# Patient Record
Sex: FEMALE | Race: WHITE | NOT HISPANIC OR LATINO | Employment: STUDENT | URBAN - METROPOLITAN AREA
[De-identification: names, ages, dates, MRNs, and addresses within clinical notes are randomized per-mention and may not be internally consistent; named-entity substitution may affect disease eponyms.]

---

## 2017-02-24 ENCOUNTER — OFFICE VISIT (OUTPATIENT)
Dept: NEUROLOGY | Facility: CLINIC | Age: 22
End: 2017-02-24
Payer: COMMERCIAL

## 2017-02-24 VITALS
BODY MASS INDEX: 22.08 KG/M2 | SYSTOLIC BLOOD PRESSURE: 107 MMHG | WEIGHT: 149.06 LBS | DIASTOLIC BLOOD PRESSURE: 71 MMHG | HEART RATE: 65 BPM | HEIGHT: 69 IN

## 2017-02-24 DIAGNOSIS — G44.86 CERVICOGENIC HEADACHE: ICD-10-CM

## 2017-02-24 DIAGNOSIS — M54.81 BILATERAL OCCIPITAL NEURALGIA: ICD-10-CM

## 2017-02-24 DIAGNOSIS — S06.0X0A CONCUSSION, WITHOUT LOSS OF CONSCIOUSNESS, INITIAL ENCOUNTER: Primary | ICD-10-CM

## 2017-02-24 DIAGNOSIS — H51.9 CONVERGENCE INSUFFICIENCY OR PALSY IN BINOCULAR EYE MOVEMENT: ICD-10-CM

## 2017-02-24 DIAGNOSIS — H81.90 VESTIBULOPATHY, UNSPECIFIED LATERALITY: ICD-10-CM

## 2017-02-24 DIAGNOSIS — R51.9 CHRONIC DAILY HEADACHE: ICD-10-CM

## 2017-02-24 PROCEDURE — 99244 OFF/OP CNSLTJ NEW/EST MOD 40: CPT | Mod: S$GLB,,, | Performed by: PSYCHIATRY & NEUROLOGY

## 2017-02-24 PROCEDURE — 99999 PR PBB SHADOW E&M-EST. PATIENT-LVL III: CPT | Mod: PBBFAC,,, | Performed by: PSYCHIATRY & NEUROLOGY

## 2017-02-24 RX ORDER — ACETAMINOPHEN 500 MG
500 TABLET ORAL EVERY 6 HOURS PRN
COMMUNITY
End: 2017-05-11

## 2017-02-24 RX ORDER — NAPROXEN 500 MG/1
500 TABLET ORAL
Refills: 0 | COMMUNITY
Start: 2017-02-21 | End: 2017-05-11

## 2017-02-24 RX ORDER — METHYLPREDNISOLONE 4 MG/1
TABLET ORAL
Qty: 1 PACKAGE | Refills: 0 | Status: SHIPPED | OUTPATIENT
Start: 2017-02-24 | End: 2017-05-11

## 2017-02-24 RX ORDER — ALBUTEROL SULFATE 90 UG/1
AEROSOL, METERED RESPIRATORY (INHALATION)
Refills: 6 | COMMUNITY
Start: 2016-12-28

## 2017-02-24 RX ORDER — TIZANIDINE 2 MG/1
4 TABLET ORAL EVERY 6 HOURS PRN
Qty: 20 TABLET | Refills: 0 | Status: SHIPPED | OUTPATIENT
Start: 2017-02-24 | End: 2017-03-06

## 2017-02-24 RX ORDER — CYPROHEPTADINE HYDROCHLORIDE 4 MG/1
4 TABLET ORAL 2 TIMES DAILY
Qty: 60 TABLET | Refills: 0 | Status: SHIPPED | OUTPATIENT
Start: 2017-02-24 | End: 2017-05-11

## 2017-02-24 NOTE — PROGRESS NOTES
Subjective:       Patient ID: Castro Rao is a 21 y.o. female.    Chief Complaint:  Consult    Consultation Requested by:   Kang Galvez Md  [address] [place] [address]    History of Present Illness  21-year-old right-handed female presents for evaluation of concussion sustained on 2/20/17 at approximately 6 PM when she was AT [facility] at the [facility] drinking water from the water fountain built into an alcove.  She notes that she pulled her head up quickly and slammed her face and occiput against the wall/alcove near the water fountain.  There was no loss of consciousness at that time.  She does have chronic headaches which she has had almost daily for 6 years and she notes that she had a severe increase in her headaches.  She is quite disoriented and went back to her room.  Her boyfriend brought her to the emergency room at [facility].  At that time she complained of headache and neck pain and had a CT scan of the head and neck which showed no acute process by patient report.  She notes that she was given nausea medication as well as headache medication which did not help and actually made her symptoms somewhat worse.  She notes that since that time she has had a very difficult week and has actually missed a test due to severe photophobia.  She notes that naproxen has helped somewhat although has made her tired.  She is also been taking extra strength Tylenol on top of this.  She notes that her headaches currently are you worse than her usual headaches.  She notes that she has pain in the nasal area, bitemporal, by lateral occipital, bilateral nuchal areas accompanied with light sensitivity, sound sensitivity, nausea but no vomiting.  Her boyfriend notes that she has had nausea for approximately 2 months it has worsened with her menstrual cycle.  She notes that previously when she had taken Excedrin for her 6 years of daily headache this had caused her  nausea.  She notes that she will take for 5 Advil one day a week or every 10 days.  She notes that she has tried a host of migraine medications previously but this caused her nausea and dizziness.  She notes that this is her fourth concussion.  She notes that she had a concussion in June 2016 when she was trying to show one of her friends had a serve and she was struck in the back of the head by a fan blade.  She was injured during her sophomore year while she was surfing.  She was injured during her freshman year while playing sambaash.  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  She denies a history of ADD and learning disability, anxiety and depression, sleep apnea or narcolepsy, epilepsy.  She admits to having one seizure that might have been related to her pertussis vaccination when she was younger.  She presents in a baseball and sunglasses.  She is extremely photophobic throughout the visit.    Past Medical History:   Diagnosis Date    Frequent headaches     Hernia, abdominal     Vertigo        Past Surgical History:   Procedure Laterality Date    HERNIA REPAIR      WRIST SURGERY         History reviewed. No pertinent family history.    Social History     Social History    Marital status: Single     Spouse name: N/A    Number of children: N/A    Years of education: N/A     Social History Main Topics    Smoking status: Never Smoker    Smokeless tobacco: None    Alcohol use Yes    Drug use: No    Sexual activity: Not Asked     Other Topics Concern    None     Social History Narrative       Review of Systems  Review of Systems   Constitutional: Positive for fatigue.   Eyes: Positive for photophobia and visual disturbance.   Musculoskeletal: Positive for neck pain and neck stiffness.   Neurological: Positive for dizziness, light-headedness and headaches.   Psychiatric/Behavioral: Positive for confusion, decreased concentration, dysphoric mood and sleep  disturbance. The patient is nervous/anxious.    All other systems reviewed and are negative.      Objective:     Vitals:    02/24/17 1344   BP: 107/71   Pulse: 65      Physical Exam   Constitutional: She is oriented to person, place, and time. She appears well-developed and well-nourished. No distress.   HENT:   Head: Normocephalic and atraumatic.   Right Ear: Hearing normal.   Left Ear: Hearing normal.   Eyes: EOM are normal. Pupils are equal, round, and reactive to light. Right eye exhibits normal extraocular motion and no nystagmus. Left eye exhibits normal extraocular motion and no nystagmus.   Neck: Neck supple. Muscular tenderness present. No spinous process tenderness present. Carotid bruit is not present. No rigidity. Decreased range of motion present.       Cardiovascular: Exam reveals no S4.    Neurological: She is alert and oriented to person, place, and time. She has normal strength and normal reflexes. She displays no atrophy and no tremor. No cranial nerve deficit or sensory deficit. She exhibits normal muscle tone. She displays a negative Romberg sign. Coordination and gait normal. GCS eye subscore is 4. GCS verbal subscore is 5. GCS motor subscore is 6.   Reflex Scores:       Tricep reflexes are 2+ on the right side and 2+ on the left side.       Bicep reflexes are 2+ on the right side and 2+ on the left side.       Brachioradialis reflexes are 2+ on the right side and 2+ on the left side.       Patellar reflexes are 2+ on the right side and 2+ on the left side.       Achilles reflexes are 2+ on the right side and 2+ on the left side.  Fundoscopic exam shows no papilledema, no hemorrhage, no exudates bilaterally.    Cranial nerves 2-12 are without deficit.   Psychiatric: She has a normal mood and affect. Her speech is normal and behavior is normal. Thought content normal.   Vitals reviewed.      Neurologic Exam     Mental Status   Oriented to person, place, and time.   Attention: decreased.  Concentration: decreased.   Speech: speech is normal   Level of consciousness: alert  Knowledge: good.   Normal comprehension.     Cranial Nerves   Cranial nerves II through XII intact.     CN II   Visual fields full to confrontation.     CN III, IV, VI   Pupils are equal, round, and reactive to light.  Extraocular motions are normal.     CN V   Facial sensation intact.     CN VII   Facial expression full, symmetric.     CN VIII   CN VIII normal.     CN IX, X   CN IX normal.   CN X normal.     CN XI   CN XI normal.     CN XII   CN XII normal.        THOMAS is abnormal, on floor 7/10/7.    Convergence insufficiency at 40 cm.  Transient diplopia bilaterally on horizontal extraocular motion.     Motor Exam   Muscle bulk: normal  Overall muscle tone: normal    Strength   Strength 5/5 throughout.     Sensory Exam   Light touch normal.   Pinprick normal.     Gait, Coordination, and Reflexes     Gait  Gait: wide-based    Reflexes   Right brachioradialis: 2+  Left brachioradialis: 2+  Right biceps: 2+  Left biceps: 2+  Right triceps: 2+  Left triceps: 2+  Right patellar: 2+  Left patellar: 2+  Right achilles: 2+  Left achilles: 2+    I spent over 50% of a 45 minute visit in guidance, counseling and discussion of treatment options.  Assessment:        1. Concussion, without loss of consciousness, initial encounter  methylPREDNISolone (MEDROL DOSEPACK) 4 mg tablet    tizanidine (ZANAFLEX) 2 MG tablet    cyproheptadine (PERIACTIN) 4 mg tablet   2. Cervicogenic headache  tizanidine (ZANAFLEX) 2 MG tablet   3. Bilateral occipital neuralgia     4. Vestibulopathy, unspecified laterality     5. Convergence insufficiency or palsy in binocular eye movement     6. Chronic daily headache  cyproheptadine (PERIACTIN) 4 mg tablet        Plan:       21-year-old right-handed female presents for evaluation and follow-up of concussion sustained on 2/20/17 when she struck her head after rapidly picking up after drinking from water fountain at  [facility].  She is a senior in public health and notes that she has missed a class.  I have given her academic accommodations as outlined below.  Should her professors require additional paperwork I will happy to have my office provide it.  At this point in time due to her significant cervicogenic component of her headache and muscle tenderness I will prescribe her tizanidine when necessary muscle spasm.  I will also prescribe her Periactin by mouth twice a day for her headaches and her anorexia related to her concussion.  As the naproxen has been helpful but has not completely taking away her headaches I will prescribe her a Medrol Dosepak to supply the anti-inflammatory for a six-day course.  I have also recommended melatonin that she take to help with her insomnia until I see her next.  I will see her back in 2-3 weeks.  At that time if she is still having problems and I would consider referral to physical therapy and occupational therapy for vestibular rehabilitation, cervicogenic headache rehabilitation and oculomotor rehabilitation actively.  We have had a long discussion about the pathophysiology and expected trajectory of improvement after concussion on today's visit.  I've discussed risk benefits and alternatives to the treatment plan as outlined above.  I have discussed with the patient that once she is back to her normal level of cognition after the concussion is over we may discuss the appropriate treatment potentially for chronic daily headaches.     The patient verbalizes understanding and agreement with the treatment plan. Questions were sought and answered to her stated verbal satisfaction.        Faina Arias MD    This note is dictated on Dragon Natural Speaking word recognition program. There are word recognition mistakes that are occasionally missed on review.

## 2017-02-24 NOTE — LETTER
February 24, 2017      Kang Smith MD  [address] [facility] [address]           [facility]  [address]  [facility] [address]  Phone: [phone]  Fax: [phone]          Patient: Castro Rao   MR Number: [id number]   YOB: 1995   Date of Visit: 2/24/2017       Dear Dr. Kang Smith:    Thank you for referring Castro Rao to me for evaluation. Attached you will find relevant portions of my assessment and plan of care.    If you have questions, please do not hesitate to call me. I look forward to following Castro Rao along with you.    Sincerely,    Narciso Arias III, MD    Enclosure  CC:  No Recipients    If you would like to receive this communication electronically, please contact [email or web address] or [phone] to request more information on [employer] Link access.    For providers and/or their staff who would like to refer a patient to [facility], please contact us through our one-stop-shop provider referral line, [facility], at [phone].    If you feel you have received this communication in error or would no longer like to receive these types of communications, please e-mail [email or web address]

## 2017-02-24 NOTE — PATIENT INSTRUCTIONS
Neck Exercises: Head Lifts       Do this exercise on your hands and knees. Keep your knees under your hips and your hands under your shoulders. Keep your spine in a neutral position (not arched or sagging). Keep your ears in line with your shoulders. Hold for a few seconds before starting the exercise:  · Keeping your back straight, slowly drop your chin toward your chest. Tuck in your chin.  · Hold for 5 seconds. Then lift your head until your neck is level with your back.  · Hold for 5 seconds. Repeat 5 times.  Date Last Reviewed: 10/1/2015  © 2199-6403 [employer]. [address] [place] [address]. All rights reserved. This information is not intended as a substitute for professional medical care. Always follow your healthcare professional's instructions.            Shoulder and Upper Back Stretch  To start, stand tall with your ears, shoulders, and hips in line. Your feet should be slightly apart, positioned just under your hips. Focus your eyes directly in front of you.  this position for a few seconds before starting your exercise. This helps increase your awareness of proper posture.  Reach overhead and slightly back with both arms. Keep your shoulders and neck aligned and your elbows behind your shoulders:  · With your palms facing the ceiling, turn your fingers inward.  · Take a deep breath. Breathe out, and lower your elbows toward your buttocks. Hold for 5 seconds, then return to starting position.  · Repeat 3 times.       Date Last Reviewed: 8/16/2015  © 1861-4865 [employer]. [address] [place] [address]. All rights reserved. This information is not intended as a substitute for professional medical care. Always follow your healthcare professional's instructions.

## 2017-02-24 NOTE — MR AVS SNAPSHOT
[facility]  [address]  [facility] [address]  Phone: [phone]  Fax: [phone]                  Castro Rao   2017 1:20 PM   Office Visit    Description:  Female : 1995   Provider:  Narciso Arias III, MD   Department:  [facility]           Reason for Visit     Consult           Diagnoses this Visit        Comments    Concussion, without loss of consciousness, initial encounter    -  Primary     Cervicogenic headache         Bilateral occipital neuralgia         Vestibulopathy, unspecified laterality         Convergence insufficiency or palsy in binocular eye movement         Chronic daily headache                To Do List           Goals (5 Years of Data)     None      Follow-Up and Disposition     Return in about 3 weeks (around 3/17/2017).       These Medications        Disp Refills Start End    methylPREDNISolone (MEDROL DOSEPACK) 4 mg tablet 1 Package 0 2017     use as directed    Pharmacy: [facility] [address] [place] [facility] Ph #: [phone]       tizanidine (ZANAFLEX) 2 MG tablet 20 tablet 0 2017 3/6/2017    Take 2 tablets (4 mg total) by mouth every 6 (six) hours as needed. - Oral    Pharmacy: [facility] [address] AT [facility] Ph #: [phone]       cyproheptadine (PERIACTIN) 4 mg tablet 60 tablet 0 2017     Take 1 tablet (4 mg total) by mouth 2 (two) times daily. - Oral    Pharmacy: [facility] [address] AT [facility] Ph #: [phone]         [facility] On Call     [facility] On Call Nurse Care Line -  Assistance  Registered nurses in the [facility] On [facility] Center provide clinical advisement, health education, appointment booking, and other advisory services.  Call for this free service at [phone].              Medications           Message regarding Medications     Verify the changes and/or additions to your medication regime listed below are the same as discussed with your clinician today.  If any of these changes or additions are incorrect, please notify your healthcare provider.        START taking these NEW medications        Refills    methylPREDNISolone (MEDROL DOSEPACK) 4 mg tablet 0    Sig: use as directed    Class: Normal    tizanidine (ZANAFLEX) 2 MG tablet 0    Sig: Take 2 tablets (4 mg total) by mouth every 6 (six) hours as needed.    Class: Normal    Route: Oral    cyproheptadine (PERIACTIN) 4 mg tablet 0    Sig: Take 1 tablet (4 mg total) by mouth 2 (two) times daily.    Class: Normal    Route: Oral      STOP taking these medications     ibuprofen (ADVIL,MOTRIN) 200 MG tablet Take 800 mg by mouth every 6 (six) hours as needed for Pain.           Verify that the below list of medications is an accurate representation of the medications you are currently taking.  If none reported, the list may be blank. If incorrect, please contact your healthcare provider. Carry this list with you in case of emergency.           Current Medications     acetaminophen (TYLENOL) 500 MG tablet Take 500 mg by mouth every 6 (six) hours as needed for Pain.    naproxen (NAPROSYN) 500 MG tablet 500 mg.    VENTOLIN HFA 90 mcg/actuation inhaler 2 PUFFS AS NEEDED EVERY 4 HOURS BY INHALATION    cyproheptadine (PERIACTIN) 4 mg tablet Take 1 tablet (4 mg total) by mouth 2 (two) times daily.    methylPREDNISolone (MEDROL DOSEPACK) 4 mg tablet use as directed    tizanidine (ZANAFLEX) 2 MG tablet Take 2 tablets (4 mg total) by mouth every 6 (six) hours as needed.           Clinical Reference Information           Your Vitals Were     BP                   107/71           Blood Pressure          Most Recent Value    BP  107/71      Allergies as of 2/24/2017     Pertussis Vaccines      Immunizations Administered on Date of Encounter -  2/24/2017     None      [facility] Sign-Up     Activating your [facility] account is as easy as 1-2-3!     1) Visit [email or web address], select Sign Up Now, enter this activation code and your date of birth, then select Next.  [email or web address]  Expires: 4/10/2017  1:20 PM      2) Create a username and password to use when you visit [facility] in the future and select a security question in case you lose your password and select Next.    3) Enter your e-mail address and click Sign Up!    Additional Information  If you have questions, please e-mail [email or web address] or call [phone] to talk to our [facility] staff. Remember, [facility] is NOT to be used for urgent needs. For medical emergencies, dial 911.         Instructions      Neck Exercises: Head Lifts       Do this exercise on your hands and knees. Keep your knees under your hips and your hands under your shoulders. Keep your spine in a neutral position (not arched or sagging). Keep your ears in line with your shoulders. Hold for a few seconds before starting the exercise:  · Keeping your back straight, slowly drop your chin toward your chest. Tuck in your chin.  · Hold for 5 seconds. Then lift your head until your neck is level with your back.  · Hold for 5 seconds. Repeat 5 times.  Date Last Reviewed: 10/1/2015  © 3740-1879 "[employer]. [address] [place] [address], [place] [address]. All rights reserved. This information is not intended as a substitute for professional medical care. Always follow your healthcare professional's instructions.            Shoulder and Upper Back Stretch  To start, stand tall with your ears, shoulders, and hips in line. Your feet should be slightly apart, positioned just under your hips. Focus your eyes directly in front of you.  this position for a few seconds before starting your exercise. This helps increase your awareness of proper posture.  Reach overhead and slightly back with both arms. Keep your shoulders  and neck aligned and your elbows behind your shoulders:  · With your palms facing the ceiling, turn your fingers inward.  · Take a deep breath. Breathe out, and lower your elbows toward your buttocks. Hold for 5 seconds, then return to starting position.  · Repeat 3 times.       Date Last Reviewed: 8/16/2015  © 4532-6508 [employer]. [address] [place] [address]. All rights reserved. This information is not intended as a substitute for professional medical care. Always follow your healthcare professional's instructions.             Language Assistance Services     ATTENTION: Language assistance services are available, free of charge. Please call [phone].      ATENCIÓN: Si habla leah, tiene a mckeon disposición servicios gratuitos de asistencia lingüística. Llame al [phone].     CHÚ Ý: N?u b?n nói Ti?ng Vi?t, có các d?ch v? h? tr? ngôn ng? mi?n phí dành cho b?n. G?i s? [phone].         [facility] complies with applicable Federal civil rights laws and does not discriminate on the basis of race, color, national origin, age, disability, or sex.

## 2017-03-09 ENCOUNTER — OFFICE VISIT (OUTPATIENT)
Dept: NEUROLOGY | Facility: CLINIC | Age: 22
End: 2017-03-09
Payer: COMMERCIAL

## 2017-03-09 VITALS
HEIGHT: 69 IN | BODY MASS INDEX: 22.49 KG/M2 | HEART RATE: 60 BPM | SYSTOLIC BLOOD PRESSURE: 100 MMHG | WEIGHT: 151.88 LBS | DIASTOLIC BLOOD PRESSURE: 70 MMHG

## 2017-03-09 DIAGNOSIS — S06.0X0D CONCUSSION, WITHOUT LOSS OF CONSCIOUSNESS, SUBSEQUENT ENCOUNTER: Primary | ICD-10-CM

## 2017-03-09 DIAGNOSIS — S13.4XXD WHIPLASH, SUBSEQUENT ENCOUNTER: ICD-10-CM

## 2017-03-09 DIAGNOSIS — G44.86 CERVICOGENIC HEADACHE: ICD-10-CM

## 2017-03-09 DIAGNOSIS — H81.90 VESTIBULOPATHY, UNSPECIFIED LATERALITY: ICD-10-CM

## 2017-03-09 DIAGNOSIS — M54.81 BILATERAL OCCIPITAL NEURALGIA: ICD-10-CM

## 2017-03-09 DIAGNOSIS — H51.9 CONVERGENCE INSUFFICIENCY OR PALSY IN BINOCULAR EYE MOVEMENT: ICD-10-CM

## 2017-03-09 PROCEDURE — 99214 OFFICE O/P EST MOD 30 MIN: CPT | Mod: S$GLB,,, | Performed by: PSYCHIATRY & NEUROLOGY

## 2017-03-09 PROCEDURE — 1160F RVW MEDS BY RX/DR IN RCRD: CPT | Mod: S$GLB,,, | Performed by: PSYCHIATRY & NEUROLOGY

## 2017-03-09 PROCEDURE — 99999 PR PBB SHADOW E&M-EST. PATIENT-LVL III: CPT | Mod: PBBFAC,,, | Performed by: PSYCHIATRY & NEUROLOGY

## 2017-03-09 NOTE — PROGRESS NOTES
Subjective:       Patient ID: Castro Rao is a 21 y.o. female.    Reason for Consult: Concussion      Interval History:  Castro Rao is here for follow up. Their condition has improved somewhat.  She notes that headaches and dizziness are still an issue.  Unfortunately she only took Periactin 4 mg daily instead of twice a day.  She notes that the Medrol Dosepak seemed to help however she lost the last day of the steroid taper.  She notes that her neck is still bothering her area she notes that she is able to do her work and has been able to use the academic accommodations that I had given her previously.  She notes that cognitive exertion seems to worsen her headache.  She has really not been exerting physical exertion more than walking as of late.     Objective:     Vitals:    03/09/17 1456   BP: 100/70   Pulse: 60     Patient is awake alert oriented to person place and time.  The point of convergence is 25 cm which is abnormal.  THOMAS is abnormal 5/8/7.  Focused examination was undertaken today. Over 50% of face to face time of 25 minute visit time was in giving guidance, counseling and discussing treatment options.    Results for orders placed or performed during the hospital encounter of 06/02/16   CT Head Without Contrast    Narrative    CT head without contrast    History: Injury    Comparison: None    Technique: Contiguous axial images were acquired from vertex to skull base without contrast.    Findings: There is no evidence acute intracranial abnormality.  Specifically there is no evidence acute infarct, contusion, extra-axial fluid collection or midline shift.  The ventricles are normal in size and configuration.  The calvarium is intact.  The paranasal sinuses and mastoid air cells are clear.    Impression     No evidence acute intracranial abnormality.  ______________________________________     Electronically signed by: SARAH ARAMBULA MD  Date:     06/02/16  Time:    18:39         Assessment:     1. Concussion, without loss of consciousness, subsequent encounter  Ambulatory Referral to Physical/Occupational Therapy   2. Cervicogenic headache  Ambulatory Referral to Physical/Occupational Therapy   3. Bilateral occipital neuralgia     4. Vestibulopathy, unspecified laterality  Ambulatory Referral to Physical/Occupational Therapy   5. Convergence insufficiency or palsy in binocular eye movement  Ambulatory Referral to Physical/Occupational Therapy   6. Whiplash, subsequent encounter  Ambulatory Referral to Physical/Occupational Therapy       Plan:   21-year-old right-handed female presents for follow-up of her third concussion sustained on 2/24/17.  She was injured while struck her head while getting up from drinking water at a waterfront in the [facility].  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  At this point in time as the patient still has cervicogenic headache, convergence insufficiency and vestibulopathy I will refer her for evaluation at [facility] sports medicine [place] and treatment for rehabilitation of these issues.  I will see her back after her examinations in May.  If she is still dealing with symptoms at that time I would likely see if I can refer her to one of my colleagues who is expert in concussion in [place] and in [facility] as appropriate prior to her Missionary trip.  I will follow up with them in 2 month(s).  The patient verbalizes understanding and agreement with the treatment plan. Questions were sought and answered to her stated verbal satisfaction.        Faina Arias MD    This note is dictated on Dragon Natural Speaking word recognition program. There are word recognition mistakes that are occasionally missed on review.

## 2017-03-21 ENCOUNTER — TELEPHONE (OUTPATIENT)
Dept: NEUROLOGY | Facility: CLINIC | Age: 22
End: 2017-03-21

## 2017-03-21 NOTE — TELEPHONE ENCOUNTER
We did not send a fax. Unable to reach patient- mailbox full.     Reprinted order from last visit and faxed to [facility]. Confirmation of transmission printed

## 2017-03-21 NOTE — TELEPHONE ENCOUNTER
----- Message from Lisa Noguera sent at 3/21/2017 12:47 PM CDT -----  Contact: Bob with [facility]  x 1st Request  _  2nd Request  _  3rd Request        Who: Bob with [facility]    Why: Ms. Rojas is calling to have pt prescription that was faxed over to them for pt physical therapy resent.  Ms. Rojas says that the fax didn't come through all the way.  Please contact Bob to further discuss and advise.    What Number to Call Back:  [phone] & [phone](F)    When to Expect a call back: (Before the end of the day)   -- if the call is after 12:00, the call back will be tomorrow.

## 2017-05-11 ENCOUNTER — OFFICE VISIT (OUTPATIENT)
Dept: NEUROLOGY | Facility: CLINIC | Age: 22
End: 2017-05-11
Payer: COMMERCIAL

## 2017-05-11 VITALS
HEIGHT: 69 IN | BODY MASS INDEX: 23.15 KG/M2 | SYSTOLIC BLOOD PRESSURE: 112 MMHG | WEIGHT: 156.31 LBS | DIASTOLIC BLOOD PRESSURE: 78 MMHG | HEART RATE: 80 BPM

## 2017-05-11 DIAGNOSIS — G43.829 MENSTRUAL MIGRAINE WITHOUT STATUS MIGRAINOSUS, NOT INTRACTABLE: ICD-10-CM

## 2017-05-11 DIAGNOSIS — R11.0 NAUSEA: ICD-10-CM

## 2017-05-11 PROCEDURE — 1160F RVW MEDS BY RX/DR IN RCRD: CPT | Mod: S$GLB,,, | Performed by: PSYCHIATRY & NEUROLOGY

## 2017-05-11 PROCEDURE — 99214 OFFICE O/P EST MOD 30 MIN: CPT | Mod: S$GLB,,, | Performed by: PSYCHIATRY & NEUROLOGY

## 2017-05-11 PROCEDURE — 99999 PR PBB SHADOW E&M-EST. PATIENT-LVL III: CPT | Mod: PBBFAC,,, | Performed by: PSYCHIATRY & NEUROLOGY

## 2017-05-11 RX ORDER — SUMATRIPTAN 50 MG/1
50 TABLET, FILM COATED ORAL
Qty: 9 TABLET | Refills: 6 | Status: SHIPPED | OUTPATIENT
Start: 2017-05-11 | End: 2017-06-10

## 2017-05-11 RX ORDER — PROMETHAZINE HYDROCHLORIDE 25 MG/1
25 TABLET ORAL EVERY 6 HOURS PRN
Qty: 20 TABLET | Refills: 6 | Status: SHIPPED | OUTPATIENT
Start: 2017-05-11

## 2017-05-11 NOTE — MR AVS SNAPSHOT
[facility]  [address]  [facility] [address]  Phone: [phone]  Fax: [phone]                  Castro Rao   2017 3:00 PM   Office Visit    Description:  Female : 1995   Provider:  Narciso Arias III, MD   Department:  [facility]           Reason for Visit     Headache           Diagnoses this Visit        Comments    Menstrual migraine without status migrainosus, not intractable         Nausea                To Do List           Goals (5 Years of Data)     None       These Medications        Disp Refills Start End    sumatriptan (IMITREX) 50 MG tablet 9 tablet 6 2017 6/10/2017    Take 1 tablet (50 mg total) by mouth every 2 (two) hours as needed. - Oral    Pharmacy: [facility] [address] [place], [place] [address] [address] AT [facility] Ph #: [phone]       promethazine (PHENERGAN) 25 MG tablet 20 tablet 6 2017     Take 1 tablet (25 mg total) by mouth every 6 (six) hours as needed for Nausea. - Oral    Pharmacy: [facility] [address] [place], [place] [address] [address] [facility] Ph #: [phone]         [facility]     [facility] Care [facility] - 24/7 Assistance  Unless otherwise directed by your provider, please contact [facility], our nurse care line that is available for 24/7 assistance.     Registered nurses in the [facility] provide: appointment scheduling, clinical advisement, health education, and other advisory services.  Call: [phone] (toll free)               Medications           Message regarding Medications     Verify the changes and/or additions to your medication regime listed below are the same as discussed with your clinician today.  If any of these changes or additions are incorrect, please notify your healthcare provider.        START taking these NEW medications        Refills    sumatriptan (IMITREX) 50 MG tablet 6  "   Sig: Take 1 tablet (50 mg total) by mouth every 2 (two) hours as needed.    Class: Normal    Route: Oral    promethazine (PHENERGAN) 25 MG tablet 6    Sig: Take 1 tablet (25 mg total) by mouth every 6 (six) hours as needed for Nausea.    Class: Normal    Route: Oral      STOP taking these medications     acetaminophen (TYLENOL) 500 MG tablet Take 500 mg by mouth every 6 (six) hours as needed for Pain.    methylPREDNISolone (MEDROL DOSEPACK) 4 mg tablet use as directed    cyproheptadine (PERIACTIN) 4 mg tablet Take 1 tablet (4 mg total) by mouth 2 (two) times daily.    naproxen (NAPROSYN) 500 MG tablet 500 mg.           Verify that the below list of medications is an accurate representation of the medications you are currently taking.  If none reported, the list may be blank. If incorrect, please contact your healthcare provider. Carry this list with you in case of emergency.           Current Medications     VENTOLIN HFA 90 mcg/actuation inhaler 2 PUFFS AS NEEDED EVERY 4 HOURS BY INHALATION    promethazine (PHENERGAN) 25 MG tablet Take 1 tablet (25 mg total) by mouth every 6 (six) hours as needed for Nausea.    sumatriptan (IMITREX) 50 MG tablet Take 1 tablet (50 mg total) by mouth every 2 (two) hours as needed.           Clinical Reference Information           Your Vitals Were     BP Pulse Height Weight BMI    112/78 80 5' 9" (1.753 m) 70.9 kg (156 lb 4.9 oz) 23.08 kg/m2      Blood Pressure          Most Recent Value    BP  112/78      Allergies as of 5/11/2017     Pertussis Vaccines      Immunizations Administered on Date of Encounter - 5/11/2017     None      [facility] Sign-Up     Activating your [facility] account is as easy as 1-2-3!     1) Visit [email or web address], select Sign Up Now, enter this activation code and your date of birth, then select Next.  [email or web address]  Expires: 6/25/2017  3:35 PM      2) Create a username and password to use when you visit [facility] in the future and select a security " question in case you lose your password and select Next.    3) Enter your e-mail address and click Sign Up!    Additional Information  If you have questions, please e-mail [email or web address] or call [phone] to talk to our [facility] staff. Remember, [facility] is NOT to be used for urgent needs. For medical emergencies, dial 911.         Instructions    Supplements for Migraine:    1. Magnesium Oxide - 400mg by mouth daily    2. Riboflavin (Vitamin B2)  - 400mg by mouth daily    3. Coenzyme Q10 - 200mg by mouth daily    4. Butter Bur - 75mg by mouth twice daily         Language Assistance Services     ATTENTION: Language assistance services are available, free of charge. Please call [phone].      ATENCIÓN: Si belgica lynn, tiene a mckeon disposición servicios gratuitos de asistencia lingüística. Llame al [phone].     CHÚ Ý: N?u b?n nói Ti?ng Vi?t, có các d?ch v? h? tr? ngôn ng? mi?n phí dành cho b?n. G?i s? [phone].         [facility] complies with applicable Federal civil rights laws and does not discriminate on the basis of race, color, national origin, age, disability, or sex.

## 2017-05-11 NOTE — PROGRESS NOTES
Subjective:       Patient ID: Castro Rao is a 21 y.o. female.    Reason for Consult: Headache      Interval History:  Castro Rao is here for follow up. Their condition has improved from a concussion perspective.  The patient and I have had a long talk today about the fact that all of her concussion symptoms have completely resolved.  She notes however that at baseline she was having one severe headache a week and during her menstrual cycle she would have for days of severe headache.  She notes that she has never had control of this and has had problems with this for the last 6 years.  She notes that when she would have a headache she would take for 5 Advil at a time and notes that she will have significant nausea with Excedrin.  We have discussed migraine treatment strategies on today's visit at length.     Objective:     Vitals:    05/11/17 1509   BP: 112/78   Pulse: 80     Patient is awake alert oriented person place and time.  Moves all 4 extremities against gravity.  Gait and station within normal limits.  Focused examination was undertaken today. Over 50% of face to face time of 25 minute visit time was in giving guidance, counseling and discussing treatment options.    Assessment/Plan:     Problem List Items Addressed This Visit        Neuro    Menstrual migraine without status migrainosus, not intractable    Overview     Worse with period, can occur 4 days in a row, with severe headache         Current Assessment & Plan     Migraine supplements  Imitrex PRN         Relevant Medications    sumatriptan (IMITREX) 50 MG tablet       Fluids/Electrolytes/Nutrition/GI    Nausea    Overview     Associated with migraine         Relevant Medications    promethazine (PHENERGAN) 25 MG tablet        21-year-old female presents for evaluation and follow-up of concussion.  At this point in time her concussion has completely resolved which are both pleased about however she notes that she is back to having  her baseline headaches with at least one severe headache a week and multiple smaller daily headaches.  At this point in time we have discussed supplements for migraine as well as the appropriate use of triptan's for migraine and antiemetics on an as-needed basis.  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  I have told her that if next to the patient portal she will send me messages from where she ends up and I can see if I can help her to try and connect to a neurologist in that area.  I will follow up with them prn.  The patient verbalizes understanding and agreement with the treatment plan. I have discussed risks, benefits and alternatives to the treatment plan. Questions were sought and answered to her stated verbal satisfaction.        Faina Arias MD    This note is dictated on Dragon Natural Speaking word recognition program. There are word recognition mistakes that are occasionally missed on review.

## 2017-05-11 NOTE — PATIENT INSTRUCTIONS
Supplements for Migraine:    1. Magnesium Oxide - 400mg by mouth daily    2. Riboflavin (Vitamin B2)  - 400mg by mouth daily    3. Coenzyme Q10 - 200mg by mouth daily    4. Butter Bur - 75mg by mouth twice daily